# Patient Record
Sex: FEMALE | Race: WHITE | Employment: UNEMPLOYED | URBAN - METROPOLITAN AREA
[De-identification: names, ages, dates, MRNs, and addresses within clinical notes are randomized per-mention and may not be internally consistent; named-entity substitution may affect disease eponyms.]

---

## 2021-12-22 ENCOUNTER — OFFICE VISIT (OUTPATIENT)
Dept: FAMILY MEDICINE CLINIC | Age: 46
End: 2021-12-22
Payer: COMMERCIAL

## 2021-12-22 VITALS
DIASTOLIC BLOOD PRESSURE: 82 MMHG | HEART RATE: 76 BPM | BODY MASS INDEX: 26.68 KG/M2 | WEIGHT: 145 LBS | OXYGEN SATURATION: 98 % | RESPIRATION RATE: 16 BRPM | HEIGHT: 62 IN | SYSTOLIC BLOOD PRESSURE: 120 MMHG | TEMPERATURE: 96.7 F

## 2021-12-22 DIAGNOSIS — R19.5 MUCOUS IN STOOLS: ICD-10-CM

## 2021-12-22 DIAGNOSIS — R21 RASH OF FACE: Primary | ICD-10-CM

## 2021-12-22 DIAGNOSIS — Z12.11 COLON CANCER SCREENING: ICD-10-CM

## 2021-12-22 PROCEDURE — 99203 OFFICE O/P NEW LOW 30 MIN: CPT | Performed by: FAMILY MEDICINE

## 2021-12-22 RX ORDER — PREGABALIN 150 MG/1
150 CAPSULE ORAL 2 TIMES DAILY
COMMUNITY

## 2021-12-22 ASSESSMENT — ENCOUNTER SYMPTOMS
NAUSEA: 0
COLOR CHANGE: 1
VOMITING: 0

## 2021-12-22 NOTE — PROGRESS NOTES
Subjective:      Patient ID: Nilam Billingsley is a 48 y.o. female    Rash  This is a chronic problem. The current episode started more than 1 month ago. Pertinent negatives include no fever or vomiting. Here to establish. Hx of RA. Has had facial rash for about a year. Rash seems to come and go. Has had mucous in stools periodically. No blood in stools. Also had elevated bp reading at recent doctor visit   Does not have bp cuff at home    Review of Systems   Constitutional: Negative for fever and unexpected weight change. Gastrointestinal: Negative for nausea and vomiting. Skin: Positive for color change and rash. Neurological: Negative for weakness. Reviewed allergy, medical, social, surgical, family and med list changes and updated   Files     Social History     Socioeconomic History    Marital status:      Spouse name: None    Number of children: None    Years of education: None    Highest education level: None   Occupational History    None   Tobacco Use    Smoking status: None    Smokeless tobacco: None   Substance and Sexual Activity    Alcohol use: None    Drug use: None    Sexual activity: None   Other Topics Concern    None   Social History Narrative    None     Social Determinants of Health     Financial Resource Strain:     Difficulty of Paying Living Expenses: Not on file   Food Insecurity:     Worried About Running Out of Food in the Last Year: Not on file    Miriam of Food in the Last Year: Not on file   Transportation Needs:     Lack of Transportation (Medical): Not on file    Lack of Transportation (Non-Medical):  Not on file   Physical Activity:     Days of Exercise per Week: Not on file    Minutes of Exercise per Session: Not on file   Stress:     Feeling of Stress : Not on file   Social Connections:     Frequency of Communication with Friends and Family: Not on file    Frequency of Social Gatherings with Friends and Family: Not on file    Attends [employer]ism Services: Not on file    Active Member of Clubs or Organizations: Not on file    Attends Club or Organization Meetings: Not on file    Marital Status: Not on file   Intimate Partner Violence:     Fear of Current or Ex-Partner: Not on file    Emotionally Abused: Not on file    Physically Abused: Not on file    Sexually Abused: Not on file   Housing Stability:     Unable to Pay for Housing in the Last Year: Not on file    Number of [place] in the Last Year: Not on file    Unstable Housing in the Last Year: Not on file     Current Outpatient Medications   Medication Sig Dispense Refill    pregabalin (LYRICA) 150 MG capsule Take 150 mg by mouth 2 times daily. No current facility-administered medications for this visit. History reviewed. No pertinent family history. History reviewed. No pertinent past medical history. Objective:   /82   Pulse 76   Temp 96.7 °F (35.9 °C)   Resp 16   Ht 5' 2\" (1.575 m)   Wt 145 lb (65.8 kg)   SpO2 98%   BMI 26.52 kg/m²     Physical Exam  Lungs:Clear  Equal b.s bilaterally  Heart:  Rate reg     No murmur  Back:       No  CVA tenderness  Abdomen: B.S present   Soft           No Tenderness         No  Rebound                    No hepatosplenomegaly. No masses. Gen:      No acute distress  Skin:      One or two minimally erythematous flat to raised lesions along chin   No pustules   Neck:. No masses. No adenopathy. No thyroid asymmetry. Pulses:Radials 2+ equal               Poster tib 1+ equal  Extremities:no edema in either leg  Gen: In no acute distress    Assessment:       Diagnosis Orders   1. Rash of face  ROWENA Pederson MD, Dermatology, [facility]   2. Colon cancer screening  [facility], [place]   3.  Mucous in stools  Irlanda Prieto, Gastroenterology, [place]         Plan:    can get us copy of extensive blood work done recently thru rheumatologist   Orders Placed This Encounter   Procedures   Jeanettedelroy [address], [address], Gastroenterology, [facility]     Referral Priority:   Routine     Referral Type:   Eval and Treat     Referral Reason:   Specialty Services Required     Referred to Provider:   Chery Blanton MD     Requested Specialty:   Gastroenterology     Number of Visits Requested:   Tiana Cope MD, Dermatology, [facility]     Referral Priority:   Routine     Referral Type:   Eval and Treat     Referral Reason:   Specialty Services Required     Referred to Provider:   Jean Claude Núñez MD     Requested Specialty:   Dermatology     Number of Visits Requested:   1   f/u in 3 months -can monitor bp readings at home by getting bp unit

## 2022-03-22 ENCOUNTER — OFFICE VISIT (OUTPATIENT)
Dept: FAMILY MEDICINE CLINIC | Age: 47
End: 2022-03-22
Payer: COMMERCIAL

## 2022-03-22 VITALS
DIASTOLIC BLOOD PRESSURE: 80 MMHG | TEMPERATURE: 97.2 F | HEIGHT: 62 IN | HEART RATE: 74 BPM | SYSTOLIC BLOOD PRESSURE: 130 MMHG | RESPIRATION RATE: 16 BRPM | OXYGEN SATURATION: 98 % | WEIGHT: 141 LBS | BODY MASS INDEX: 25.95 KG/M2

## 2022-03-22 DIAGNOSIS — R21 RASH: ICD-10-CM
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
## 2022-03-22 DIAGNOSIS — Z00.00 HEALTH MAINTENANCE EXAMINATION: Primary | ICD-10-CM

## 2022-03-22 DIAGNOSIS — G89.4 CHRONIC PAIN SYNDROME: ICD-10-CM

## 2022-03-22 DIAGNOSIS — R19.5 MUCOUS IN STOOLS: ICD-10-CM

## 2022-03-22 DIAGNOSIS — M25.511 ACUTE PAIN OF RIGHT SHOULDER: ICD-10-CM

## 2022-03-22 PROCEDURE — 99396 PREV VISIT EST AGE 40-64: CPT | Performed by: FAMILY MEDICINE

## 2022-03-22 RX ORDER — CLINDAMYCIN PHOSPHATE 10 MG/G
GEL TOPICAL 2 TIMES DAILY
COMMUNITY
End: 2022-05-10

## 2022-03-22 SDOH — ECONOMIC STABILITY: FOOD INSECURITY: WITHIN THE PAST 12 MONTHS, YOU WORRIED THAT YOUR FOOD WOULD RUN OUT BEFORE YOU GOT MONEY TO BUY MORE.: NEVER TRUE

## 2022-03-22 SDOH — ECONOMIC STABILITY: FOOD INSECURITY: WITHIN THE PAST 12 MONTHS, THE FOOD YOU BOUGHT JUST DIDN'T LAST AND YOU DIDN'T HAVE MONEY TO GET MORE.: NEVER TRUE

## 2022-03-22 ASSESSMENT — PATIENT HEALTH QUESTIONNAIRE - PHQ9
SUM OF ALL RESPONSES TO PHQ QUESTIONS 1-9: 0
1. LITTLE INTEREST OR PLEASURE IN DOING THINGS: 0
SUM OF ALL RESPONSES TO PHQ QUESTIONS 1-9: 0
2. FEELING DOWN, DEPRESSED OR HOPELESS: 0
SUM OF ALL RESPONSES TO PHQ QUESTIONS 1-9: 0
SUM OF ALL RESPONSES TO PHQ9 QUESTIONS 1 & 2: 0
SUM OF ALL RESPONSES TO PHQ QUESTIONS 1-9: 0

## 2022-03-22 ASSESSMENT — SOCIAL DETERMINANTS OF HEALTH (SDOH): HOW HARD IS IT FOR YOU TO PAY FOR THE VERY BASICS LIKE FOOD, HOUSING, MEDICAL CARE, AND HEATING?: NOT HARD AT ALL

## 2022-03-22 NOTE — PROGRESS NOTES
Subjective:      Patient ID: Devon Ford is a 50 y.o. female    HPI  Here with several problems. Has had worsening pain all over. Currently seeing rheumatology but pain not well controlled. Seeing dermatology  Currently for facial rash. Did not see gi yet -would like to see allergist as wondering about if she has allergy problem. Dana Banco about a week ago and hit shoulder on wall. Pain is doing better but still some present. Right hand dominant    Review of Systems   Constitutional: Negative for chills and fever. Musculoskeletal: Positive for arthralgias. Neurological: Negative for weakness. Reviewed allergy, medical, social, surgical, family and med list changes and updated   Files     Social History     Socioeconomic History    Marital status:      Spouse name: Not on file    Number of children: Not on file    Years of education: Not on file    Highest education level: Not on file   Occupational History    Not on file   Tobacco Use    Smoking status: Not on file    Smokeless tobacco: Not on file   Substance and Sexual Activity    Alcohol use: Not on file    Drug use: Not on file    Sexual activity: Not on file   Other Topics Concern    Not on file   Social History Narrative    Not on file     Social Determinants of Health     Financial Resource Strain: Low Risk     Difficulty of Paying Living Expenses: Not hard at all   Food Insecurity: No Food Insecurity    Worried About Running Out of Food in the Last Year: Never true    [address] in the Last Year: Never true   Transportation Needs:     Lack of Transportation (Medical): Not on file    Lack of Transportation (Non-Medical):  Not on file   Physical Activity:     Days of Exercise per Week: Not on file    Minutes of Exercise per Session: Not on file   Stress:     Feeling of Stress : Not on file   Social Connections:     Frequency of Communication with Friends and Family: Not on file    Frequency of Social Gatherings with Friends and Family: Not on file    Attends Yarsanism Services: Not on file    Active Member of Clubs or Organizations: Not on file    Attends Club or Organization Meetings: Not on file    Marital Status: Not on file   Intimate Partner Violence:     Fear of Current or Ex-Partner: Not on file    Emotionally Abused: Not on file    Physically Abused: Not on file    Sexually Abused: Not on file   Housing Stability:     Unable to Pay for Housing in the Last Year: Not on file    Number of [place] in the Last Year: Not on file    Unstable Housing in the Last Year: Not on file     Current Outpatient Medications   Medication Sig Dispense Refill    clindamycin (CLINDAGEL) 1 % gel Apply topically 2 times daily Apply topically 2 times daily.  pregabalin (LYRICA) 150 MG capsule Take 150 mg by mouth 2 times daily. No current facility-administered medications for this visit. No family history on file. No past medical history on file. Objective:   /80   Pulse 74   Temp 97.2 °F (36.2 °C)   Resp 16   Ht 5' 2\" (1.575 m)   Wt 141 lb (64 kg)   SpO2 98%   BMI 25.79 kg/m²     Physical Exam      PHYSICAL EXAMINATION:  Vital signs are as recorded. GENERAL:  alert and oriented, well nourished, well developed female in no acute distress at this time. Normal affect. HEENT:  TMs are clear. Nares are negative. Pharynx without erythema or exudate. Extraocular eye muscles are intact. Pupils are equal and reactive to light and accommodation. NECK:  exam showed no masses or adenopathy. No thyroid palpable masses appreciated or asymmetry. HEART:  RRR without murmurs or gallops. LUNGS:  clear to auscultation, equal breath sounds bilaterally, no wheezing or rhonchi noted. ABDOMEN:  soft x 4, bowel sounds positive. Negative tenderness, guarding or rebound. No hepatosplenomegaly. No masses. EXTREMITIES:  no edema at this time. NEURO: no focal deficits noted.   GENITAL EXAM: deferred. PULSES:   Radial 2+ and equal while P.T 1+ and equal.  SKIN;   Area of erythema on chin-no vesicles or ulcers. MENTAL STATUS; Alert. Awake. Tearful during several portions of exam.    Shoulder:    No  Swelling of shoulder on right side                        Diffuse   Tenderness of shoulder                         Rotator cuff strength 5/5 and equal                        Normal   Internal /external rotation of      Right Shoulder          Assessment:       Diagnosis Orders   1. Health maintenance examination     2. Rash  External Referral to Allergy   3. Mucous in stools     4. Chronic pain syndrome  Arlette Holcomb MD, Pain Management, [place]   5. Fall, initial encounter     6.  Acute pain of right shoulder           Plan:    continue to follow up with dermatology  Referral already done for GI-encouraged to follow thru with this  Can use motrin prn for shoulder pain   Orders Placed This Encounter   Procedures    Lipid Panel     Standing Status:   Future     Standing Expiration Date:   3/22/2023     Order Specific Question:   Is Patient Fasting?/# of Hours     Answer:   9    Comprehensive Metabolic Panel     Standing Status:   Future     Standing Expiration Date:   3/22/2023    CBC with Auto Differential     Standing Status:   Future     Standing Expiration Date:   9/22/2022    External Referral to Allergy     Referral Priority:   Routine     Referral Type:   Eval and Treat     Referral Reason:   Specialty Services Required     Requested Specialty:   Allergy     Number of Visits Requested:   1    ROWENA Bright MD, Pain Management, [place]     Referral Priority:   Routine     Referral Type:   Eval and Treat     Referral Reason:   Specialty Services Required     Referred to Provider:   Ronal Melo MD     Requested Specialty:   Pain Management     Number of Visits Requested:   1   f/u after blood work later this month

## 2022-05-10 ENCOUNTER — OFFICE VISIT (OUTPATIENT)
Dept: FAMILY MEDICINE CLINIC | Age: 47
End: 2022-05-10
Payer: COMMERCIAL

## 2022-05-10 VITALS
TEMPERATURE: 97.5 F | BODY MASS INDEX: 24.48 KG/M2 | DIASTOLIC BLOOD PRESSURE: 84 MMHG | WEIGHT: 133 LBS | HEIGHT: 62 IN | OXYGEN SATURATION: 99 % | SYSTOLIC BLOOD PRESSURE: 120 MMHG | HEART RATE: 71 BPM | RESPIRATION RATE: 16 BRPM

## 2022-05-10 DIAGNOSIS — G89.4 CHRONIC PAIN SYNDROME: Primary | ICD-10-CM

## 2022-05-10 PROCEDURE — 99213 OFFICE O/P EST LOW 20 MIN: CPT | Performed by: FAMILY MEDICINE

## 2022-05-10 RX ORDER — TIZANIDINE 4 MG/1
TABLET ORAL
COMMUNITY
Start: 2022-05-08

## 2022-05-10 RX ORDER — IBUPROFEN 800 MG/1
800 TABLET ORAL EVERY 8 HOURS PRN
COMMUNITY

## 2022-05-10 RX ORDER — DICLOFENAC SODIUM 75 MG/1
TABLET, DELAYED RELEASE ORAL
COMMUNITY
Start: 2022-04-12

## 2022-05-10 NOTE — PROGRESS NOTES
Subjective:      Patient ID: Abimael Douglas is a 50 y.o. female    HPI  Here in follow up of sorts. Still not gotten fasting blood work done as requested last time. .  Weight down few pounds. Did see pain management but not seen by allergy yet. Has not seen gi yet. Still seeing dermatology for recurrent facial rash. Review of Systems   Constitutional: Negative for unexpected weight change. Skin: Positive for rash. Neurological: Negative for weakness. Reviewed allergy, medical, social, surgical, family and med list changes and updated   Files     Social History     Socioeconomic History    Marital status:      Spouse name: Not on file    Number of children: Not on file    Years of education: Not on file    Highest education level: Not on file   Occupational History    Not on file   Tobacco Use    Smoking status: Not on file    Smokeless tobacco: Not on file   Substance and Sexual Activity    Alcohol use: Not on file    Drug use: Not on file    Sexual activity: Not on file   Other Topics Concern    Not on file   Social History Narrative    Not on file     Social Determinants of Health     Financial Resource Strain: Low Risk     Difficulty of Paying Living Expenses: Not hard at all   Food Insecurity: No Food Insecurity    Worried About Running Out of Food in the Last Year: Never true    [address] in the Last Year: Never true   Transportation Needs:     Lack of Transportation (Medical): Not on file    Lack of Transportation (Non-Medical):  Not on file   Physical Activity:     Days of Exercise per Week: Not on file    Minutes of Exercise per Session: Not on file   Stress:     Feeling of Stress : Not on file   Social Connections:     Frequency of Communication with Friends and Family: Not on file    Frequency of Social Gatherings with Friends and Family: Not on file    Attends Scientology Services: Not on file    Active Member of Clubs or Organizations: Not on file   Keily Attends Club or Organization Meetings: Not on file    Marital Status: Not on file   Intimate Partner Violence:     Fear of Current or Ex-Partner: Not on file    Emotionally Abused: Not on file    Physically Abused: Not on file    Sexually Abused: Not on file   Housing Stability:     Unable to Pay for Housing in the Last Year: Not on file    Number of [place] in the Last Year: Not on file    Unstable Housing in the Last Year: Not on file     Current Outpatient Medications   Medication Sig Dispense Refill    tiZANidine (ZANAFLEX) 4 MG tablet       diclofenac (VOLTAREN) 75 MG EC tablet TAKE 1 TABLET BY MOUTH TWICE DAILY after meals      ibuprofen (ADVIL;MOTRIN) 800 MG tablet Take 800 mg by mouth every 8 hours as needed for Pain      pregabalin (LYRICA) 150 MG capsule Take 150 mg by mouth 2 times daily. No current facility-administered medications for this visit. No family history on file. No past medical history on file. Objective:   /84   Pulse 71   Temp 97.5 °F (36.4 °C)   Resp 16   Ht 5' 2\" (1.575 m)   Wt 133 lb (60.3 kg)   SpO2 99%   BMI 24.33 kg/m²     Physical Exam  No exam done -reviewed vitals   Assessment:       Diagnosis Orders   1.  Chronic pain syndrome           Plan:    get blood work done and f/u after done  F/u with pain management as already planned   Strongly suggested follow up with referrals already initiated